# Patient Record
Sex: FEMALE | Race: WHITE | NOT HISPANIC OR LATINO
[De-identification: names, ages, dates, MRNs, and addresses within clinical notes are randomized per-mention and may not be internally consistent; named-entity substitution may affect disease eponyms.]

---

## 2017-05-23 ENCOUNTER — RX ONLY (OUTPATIENT)
Age: 62
Setting detail: RX ONLY
End: 2017-05-23

## 2022-05-23 ENCOUNTER — RX ONLY (OUTPATIENT)
Age: 67
Setting detail: RX ONLY
End: 2022-05-23

## 2023-05-10 ENCOUNTER — APPOINTMENT (RX ONLY)
Dept: URBAN - NONMETROPOLITAN AREA CLINIC 34 | Facility: CLINIC | Age: 68
Setting detail: DERMATOLOGY
End: 2023-05-10

## 2023-05-10 DIAGNOSIS — Z41.9 ENCOUNTER FOR PROCEDURE FOR PURPOSES OTHER THAN REMEDYING HEALTH STATE, UNSPECIFIED: ICD-10-CM

## 2023-05-10 DIAGNOSIS — L57.8 OTHER SKIN CHANGES DUE TO CHRONIC EXPOSURE TO NONIONIZING RADIATION: ICD-10-CM

## 2023-05-10 PROCEDURE — ? COUNSELING

## 2023-05-10 PROCEDURE — ? ADDITIONAL NOTES

## 2023-05-10 PROCEDURE — 99212 OFFICE O/P EST SF 10 MIN: CPT

## 2023-05-10 PROCEDURE — ? BOTOX

## 2023-05-10 ASSESSMENT — LOCATION DETAILED DESCRIPTION DERM: LOCATION DETAILED: RIGHT MEDIAL FOREHEAD

## 2023-05-10 ASSESSMENT — LOCATION SIMPLE DESCRIPTION DERM: LOCATION SIMPLE: RIGHT FOREHEAD

## 2023-05-10 ASSESSMENT — LOCATION ZONE DERM: LOCATION ZONE: FACE

## 2023-05-10 NOTE — PROCEDURE: ADDITIONAL NOTES
Additional Notes: Discussed PhotoFrax and IPL treatment
Detail Level: Detailed
Render Risk Assessment In Note?: no

## 2023-05-10 NOTE — HPI: COSMETIC CONSULTATION
Additional History: Pt would like to discuss PhotoFrax, filler and Botox \\nPt stated she is allergic to an ingredient in the juviderm filler but unsure of the ingredients name. And would like to discuss different options. \\nPt would like to discuss and see if getting Botox is possible in todays appointment

## 2023-05-10 NOTE — PROCEDURE: BOTOX
Inferior Lateral Orbicularis Oculi Units: 0
Show Additional Area 2: Yes
Show Right And Left Brow Units: No
Show Inventory Tab: Hide
Consent: Written consent obtained. Risks include but not limited to lid/brow ptosis, bruising, swelling, diplopia, temporary effect, incomplete chemical denervation.
Post-Care Instructions: Patient instructed to not lie down for 4 hours and limit physical activity for 24 hours.
Incrementing Botox Units: By 0.5 Units
Dilution (U/0.1 Cc): 4
Detail Level: Detailed
Glabellar Complex Units: 26

## 2023-05-24 ENCOUNTER — APPOINTMENT (RX ONLY)
Dept: URBAN - NONMETROPOLITAN AREA CLINIC 34 | Facility: CLINIC | Age: 68
Setting detail: DERMATOLOGY
End: 2023-05-24

## 2023-05-24 DIAGNOSIS — Z41.9 ENCOUNTER FOR PROCEDURE FOR PURPOSES OTHER THAN REMEDYING HEALTH STATE, UNSPECIFIED: ICD-10-CM

## 2023-05-24 PROCEDURE — ? PHOTO-DOCUMENTATION

## 2023-05-24 PROCEDURE — ? CANDELA NORDLYS

## 2023-05-24 PROCEDURE — ? INVENTORY

## 2023-05-24 NOTE — PROCEDURE: CANDELA NORDLYS
Hsv Prophylaxis Prescription: Valtrex 500mg BID starting the day of procedures and continuing until all sites healed

## 2023-05-24 NOTE — PROCEDURE: CANDELA NORDLYS
Eye Shielding Text (Leave Blank If Unwanted- Will Be Inserted If Selecting Eye Shields): The intraocular eye shields were placed. 2 drops of intraocular tetracaine HCL ophthalmic 0.5% solution was administered. The eye shields were coated with ophthalmic bacitracin prior to insertion. After the shields were removed the eyes were flushed with normal saline.

## 2023-07-13 ENCOUNTER — APPOINTMENT (RX ONLY)
Dept: URBAN - NONMETROPOLITAN AREA CLINIC 34 | Facility: CLINIC | Age: 68
Setting detail: DERMATOLOGY
End: 2023-07-13

## 2023-07-13 DIAGNOSIS — Z41.9 ENCOUNTER FOR PROCEDURE FOR PURPOSES OTHER THAN REMEDYING HEALTH STATE, UNSPECIFIED: ICD-10-CM

## 2023-07-13 PROCEDURE — ? CANDELA NORDLYS

## 2023-07-13 PROCEDURE — ? ADDITIONAL NOTES

## 2023-07-13 NOTE — PROCEDURE: CANDELA NORDLYS
Wavelength (Optional): 1940 nm
Wavelength (Optional): 1550 nm
Passes (Will Not Render If Zero): 0
Passes (Optional): 3
Modality: Frax
Length Topical Anesthesia Applied (Optional): 25 minutes
Energy In Mj (Optional): 40
Energy In Mj (Optional): 11
Applicator:  [phone] ([phone])
Location: nose
Location: mid face
Consent: Written consent obtained, risks reviewed including but not limited to crusting, scabbing, blistering, scarring, darker or lighter pigmentary change, and/or incomplete removal.
Energy In Mj (Optional): 42
Fluence In J/Cm2(Optional): 10.6 - 11.0
Scan Width (Optional): 10mm
Post-Care Instructions: I reviewed with the patient in detail post-care instructions.
Location: full face
Hsv Prophylaxis: no
Eye Shielding Text (Leave Blank If Unwanted- Will Be Inserted If Selecting Eye Shields): The intraocular eye shields were placed. 2 drops of intraocular tetracaine HCL ophthalmic 0.5% solution was administered. The eye shields were coated with ophthalmic bacitracin prior to insertion. After the shields were removed the eyes were flushed with normal saline.
Pulse Time In Ms (Will Not Render If Zero): 5
Pulse Delay In Ms (Optional): 10
Hsv Prophylaxis Prescription: Valtrex 500mg BID starting the day of procedures and continuing until all sites healed
Fluence In J/Cm2: 240
Passes (Will Not Render If Zero): 1
Passes (Optional): 4
Modality: SWT
Pulse Duration In Ms (Will Not Render If Zero): 16
Pulse Duration In Ms (Will Not Render If Zero): 2.5
Spot Size (Optional): 3 nm
Detail Level: Detailed
Pain Level (Optional): 2 (moderate)

## 2023-10-26 ENCOUNTER — APPOINTMENT (RX ONLY)
Dept: URBAN - NONMETROPOLITAN AREA CLINIC 34 | Facility: CLINIC | Age: 68
Setting detail: DERMATOLOGY
End: 2023-10-26

## 2023-10-26 DIAGNOSIS — Z41.9 ENCOUNTER FOR PROCEDURE FOR PURPOSES OTHER THAN REMEDYING HEALTH STATE, UNSPECIFIED: ICD-10-CM

## 2023-10-26 PROCEDURE — ? ADDITIONAL NOTES

## 2023-10-26 PROCEDURE — ? CANDELA NORDLYS

## 2023-10-26 NOTE — PROCEDURE: CANDELA NORDLYS
Wavelength (Optional): 1940 nm
Wavelength (Optional): 1550 nm
Passes (Will Not Render If Zero): 0
Passes (Optional): 3
Modality: Frax
Length Topical Anesthesia Applied (Optional): 25 minutes
Energy In Mj (Optional): 40
Energy In Mj (Optional): 11
Applicator:  [phone] ([phone])
Location: nose
Location: mid face
Consent: Written consent obtained, risks reviewed including but not limited to crusting, scabbing, blistering, scarring, darker or lighter pigmentary change, and/or incomplete removal.
Energy In Mj (Optional): 42
Fluence In J/Cm2(Optional): 10.6 - 11.0
Scan Width (Optional): 10mm
Post-Care Instructions: I reviewed with the patient in detail post-care instructions.
Location: full face
Hsv Prophylaxis: no
Eye Shielding Text (Leave Blank If Unwanted- Will Be Inserted If Selecting Eye Shields): The intraocular eye shields were placed. 2 drops of intraocular tetracaine HCL ophthalmic 0.5% solution was administered. The eye shields were coated with ophthalmic bacitracin prior to insertion. After the shields were removed the eyes were flushed with normal saline.
Pulse Time In Ms (Will Not Render If Zero): 5
Pulse Delay In Ms (Optional): 10
Hsv Prophylaxis Prescription: Valtrex 500mg BID starting the day of procedures and continuing until all sites healed
Fluence In J/Cm2: 240
Passes (Will Not Render If Zero): 1
Energy In Mj (Optional): 41.5
Passes (Optional): 4
Modality: SWT
Pulse Duration In Ms (Will Not Render If Zero): 16
Spot Size (Optional): 3 nm
Detail Level: Detailed
Pain Level (Optional): 2 (moderate)